# Patient Record
Sex: MALE | Race: OTHER | HISPANIC OR LATINO
[De-identification: names, ages, dates, MRNs, and addresses within clinical notes are randomized per-mention and may not be internally consistent; named-entity substitution may affect disease eponyms.]

---

## 2020-08-31 PROBLEM — Z00.00 ENCOUNTER FOR PREVENTIVE HEALTH EXAMINATION: Status: ACTIVE | Noted: 2020-08-31

## 2020-09-25 ENCOUNTER — APPOINTMENT (OUTPATIENT)
Dept: GASTROENTEROLOGY | Facility: CLINIC | Age: 65
End: 2020-09-25
Payer: MEDICARE

## 2020-09-25 VITALS
HEIGHT: 66 IN | HEART RATE: 77 BPM | DIASTOLIC BLOOD PRESSURE: 90 MMHG | SYSTOLIC BLOOD PRESSURE: 140 MMHG | WEIGHT: 200 LBS | TEMPERATURE: 97.4 F | BODY MASS INDEX: 32.14 KG/M2

## 2020-09-25 DIAGNOSIS — Z86.010 PERSONAL HISTORY OF COLONIC POLYPS: ICD-10-CM

## 2020-09-25 PROCEDURE — 99203 OFFICE O/P NEW LOW 30 MIN: CPT

## 2020-09-25 NOTE — REASON FOR VISIT
[Consultation] : a consultation visit [FreeTextEntry1] : Kindly asked by Dr. Blake to consult and evaluate patient for     hx polyps                \par A copy of this note is being sent to physician requesting consultation.

## 2020-09-25 NOTE — HISTORY OF PRESENT ILLNESS
[FreeTextEntry1] : 64m 63m HTN, HBV core +, prostate ca/resection '14, here for f/u colon polyp. Had large polypectomy in past - due for f/u. The patient denies abdominal pain, rectal bleeding, melena, change in bowel habits, or unexplained weight loss.\par \par Prior testing:\par        Colonoscopy '07 w/ Dr. Chan - diverticulosis - recommended 5-7y f/u.\par        5/2016 Colonoscopy for heme pos stool: Sigmoid diverticula. \par        [id number] sessile, lobulated polyp at 1fold distal to ICV @45degrees counterclockwise to ICV.epi/snare/clip.\par        6/2016 EGD h. pylori gastritis--> prevpac\par        8/2016 Colonoscopy: Puckered mucosa w/ stellate center noted 1fold distal and 1/4 turn counter clockwise from icv. 1cm polypoid area at center snared w/ immediately adjacent area bxd away. Prominent folds L of center of scar (w/ center of scar@ 6:00) bxd extensively; no adenoma on path. REC: 1y f/u.\par \par Never rescheduled w/ me b/c ins.  States he had colonoscopy last year in [facility] - not sure of findings, but states was told 1y f/u.\par \par Soc:  no tobacco or significant EtOH\par FHx: no FHx GI malignancy or IBD\par \par ROS:\par Constitutional:: no weight loss, fevers\par ENT: no deafness\par Eyes: not blind\par Neck: no LN\par Chest: no dyspnea/cough\par Cardiac: no chest pain\par Vascular: no leg swelling\par GI: no abdominal pain, nausea, vomiting, diarrhea, constipation, rectal bleeding, dysphagia, melena unless otherwise noted in HPI\par : no dysuria, dark urine\par Skin: no rashes, jaundice\par Heme: no bleeding\par Endocrine: no DM unless otherwise stated in HPI\par \par Px: (VS noted below)\par General: NAD\par Eyes: anicteric\par Oropharynx:  clear\par Neck: no LN\par Chest: normal respiratory effort\par CVS: regular\par Abd: soft, NT, ND, +BS, no HSM\par Ext: no atrophy\par Neuro: grossly nonfocal\par \par Labs/imaging/prior endoscopic results reviewed to the extent available and noted in HPI\par

## 2020-09-25 NOTE — ASSESSMENT
[FreeTextEntry1] : Colonoscopy scheduled - Risks, benefits, alternatives were discussed, including but not limited to bleeding, infection, perforation and sedation risks. Additionally, the possibility of missed lesions was conveyed. \par \par Hx sessile polyp proximally.\par \par

## 2020-10-23 ENCOUNTER — RESULT REVIEW (OUTPATIENT)
Age: 65
End: 2020-10-23

## 2020-10-25 ENCOUNTER — RESULT REVIEW (OUTPATIENT)
Age: 65
End: 2020-10-25

## 2020-10-26 ENCOUNTER — APPOINTMENT (OUTPATIENT)
Dept: GASTROENTEROLOGY | Facility: HOSPITAL | Age: 65
End: 2020-10-26

## 2021-04-09 ENCOUNTER — RESULT REVIEW (OUTPATIENT)
Age: 66
End: 2021-04-09

## 2021-04-11 ENCOUNTER — RESULT REVIEW (OUTPATIENT)
Age: 66
End: 2021-04-11

## 2021-04-12 ENCOUNTER — APPOINTMENT (OUTPATIENT)
Dept: GASTROENTEROLOGY | Facility: HOSPITAL | Age: 66
End: 2021-04-12

## 2023-04-12 NOTE — CONSULT LETTER
Procedure     ECG 12 Lead    Date/Time: 4/12/2023 3:21 PM  Performed by: Chelsea Lowry APRN  Authorized by: Chelsea Lowry APRN   Comparison: compared with previous ECG from 10/24/2022  Similar to previous ECG  Rhythm: sinus rhythm  Conduction: left anterior fascicular block  ST Segments: ST segments normal  T Waves: T waves normal  QRS axis: left  Other: no other findings    Clinical impression: abnormal EKG              [FreeTextEntry1] : Dear Dr. Blake ,\par \par I had the pleasure of evaluating your patient,  CORKY FERNANDEZ.\par \par Please refer to my note below.\par \par Thank you very much for allowing me to participate in the care of this patient.  If you have any questions, please do not hesitate to contact me.\par \par Sincerely, \par \par Ronen Riley MD\par

## 2024-06-23 ENCOUNTER — RESULT REVIEW (OUTPATIENT)
Age: 69
End: 2024-06-23

## 2024-06-24 ENCOUNTER — APPOINTMENT (OUTPATIENT)
Dept: GASTROENTEROLOGY | Facility: HOSPITAL | Age: 69
End: 2024-06-24